# Patient Record
Sex: FEMALE | Race: WHITE | NOT HISPANIC OR LATINO | Employment: OTHER | URBAN - NONMETROPOLITAN AREA
[De-identification: names, ages, dates, MRNs, and addresses within clinical notes are randomized per-mention and may not be internally consistent; named-entity substitution may affect disease eponyms.]

---

## 2024-06-18 ENCOUNTER — OFFICE VISIT (OUTPATIENT)
Dept: CARDIOLOGY | Facility: CLINIC | Age: 69
End: 2024-06-18
Payer: MEDICARE

## 2024-06-18 VITALS
WEIGHT: 157.6 LBS | DIASTOLIC BLOOD PRESSURE: 83 MMHG | HEIGHT: 64 IN | SYSTOLIC BLOOD PRESSURE: 160 MMHG | OXYGEN SATURATION: 95 % | BODY MASS INDEX: 26.91 KG/M2 | HEART RATE: 80 BPM

## 2024-06-18 DIAGNOSIS — R09.89 ABSENT PEDAL PULSES: ICD-10-CM

## 2024-06-18 DIAGNOSIS — I66.02 MIDDLE CEREBRAL ARTERY STENOSIS, LEFT: ICD-10-CM

## 2024-06-18 DIAGNOSIS — I73.9 CLAUDICATION OF BOTH LOWER EXTREMITIES: ICD-10-CM

## 2024-06-18 DIAGNOSIS — I73.9 PAD (PERIPHERAL ARTERY DISEASE): ICD-10-CM

## 2024-06-18 DIAGNOSIS — I63.9 CEREBROVASCULAR ACCIDENT (CVA), UNSPECIFIED MECHANISM: ICD-10-CM

## 2024-06-18 DIAGNOSIS — I66.01 MIDDLE CEREBRAL ARTERY STENOSIS, RIGHT: ICD-10-CM

## 2024-06-18 DIAGNOSIS — E78.5 HYPERLIPIDEMIA LDL GOAL <70: ICD-10-CM

## 2024-06-18 DIAGNOSIS — I60.9 SUBARACHNOID HEMORRHAGE: ICD-10-CM

## 2024-06-18 DIAGNOSIS — R42 DIZZINESS: ICD-10-CM

## 2024-06-18 DIAGNOSIS — I77.1 SUBCLAVIAN ARTERY STENOSIS, LEFT: ICD-10-CM

## 2024-06-18 DIAGNOSIS — I10 ESSENTIAL HYPERTENSION: ICD-10-CM

## 2024-06-18 DIAGNOSIS — I65.23 BILATERAL CAROTID ARTERY STENOSIS: Primary | ICD-10-CM

## 2024-06-18 PROBLEM — F17.200 SMOKER: Status: ACTIVE | Noted: 2024-06-18

## 2024-06-18 PROCEDURE — 3077F SYST BP >= 140 MM HG: CPT | Performed by: INTERNAL MEDICINE

## 2024-06-18 PROCEDURE — 3079F DIAST BP 80-89 MM HG: CPT | Performed by: INTERNAL MEDICINE

## 2024-06-18 PROCEDURE — 99204 OFFICE O/P NEW MOD 45 MIN: CPT | Performed by: INTERNAL MEDICINE

## 2024-06-18 RX ORDER — LEVOTHYROXINE SODIUM 0.05 MG/1
50 TABLET ORAL DAILY
COMMUNITY
Start: 2024-04-21

## 2024-06-18 RX ORDER — AMLODIPINE BESYLATE 2.5 MG/1
2.5 TABLET ORAL DAILY
Qty: 30 TABLET | Refills: 11 | Status: SHIPPED | OUTPATIENT
Start: 2024-06-18

## 2024-06-18 RX ORDER — ASPIRIN 81 MG/1
81 TABLET, CHEWABLE ORAL DAILY
COMMUNITY

## 2024-06-18 RX ORDER — LISINOPRIL 5 MG/1
5 TABLET ORAL DAILY
COMMUNITY
Start: 2024-04-19

## 2024-06-18 RX ORDER — ATORVASTATIN CALCIUM 40 MG/1
40 TABLET, FILM COATED ORAL DAILY
COMMUNITY
Start: 2024-04-19

## 2024-06-18 NOTE — PROGRESS NOTES
Subjective   Josephine Quevedo is a 68 y.o. female     Chief Complaint   Patient presents with    Establish Care     Here for eval. Recent hosp. Admit with HX multiple cerebral infarcts per MRI    Cerebrovascular Accident    Shortness of Breath    Hyperlipidemia    Hypertension    Carotid Artery Disease       PROBLEM LIST:     Has appt. With Dr. Gabrielle Carlisle, neurology at  on 7-2-2024    ROPE score = 2    MRI brain at [facility], 4- with multiple acute punctuate infarcts along MCA  with largest in posterior left parietal lobe and acute subarachnoid hemorrhage  CTA angio head/neck at [facility], 4-. 60% bilat. Carotid calcification, MCA nearly obstructed with only minimal branch vessel with perfusion, concerns for sign. Stenosis of left vertebral, severe narrowing prox. 5 mm rt. MI segment with short segment near critical stenosis approx. 5 mm from MI origin, vocal cord nodule 6 mm, 50% left subclavian artery stenosis  HTN  4.   Hyperlipidemia  5. Hypothyroidism  6. Smoker  7. Echo, 4- during admit. EF 60-65%. Mod. LVH, + bubble study for PFO, mild AV sclerosis, trace MR, mild TR, trace-mild physiologic FL    Specialty Problems    None        HPI:  Ms. Josephine Quevedo is a 68-year-old female patient of Dee Shabazz seen today to establish care and for evaluation of the above.    The patient was in her usual state of health until last month.  She awoke from sleep with symptoms compatible with left middle cerebral artery ischemia.  She was found to have total occlusion of the right middle cerebral artery, high-grade stenosis in the left middle cerebral artery, vertebral stenosis, moderate carotid disease bilaterally as well as subclavian stenosis.  Although the patient's symptoms lasted only 15 to 20 minutes by her report CT and MRI demonstrated multiple small punctate infarcts in the left MCA distribution.    An echocardiogram was performed and the patient had right to left shunting through a PFO.  The  patient was discharged on statin, aspirin for antiplatelet therapy, and lisinopril to address hypertension.  She was started on thyroid replacement because of elevated TSH.  Ms. Quevedo had no symptoms of acute cerebral ischemia since discharge from [facility].                      PRIOR MEDICATIONS    Current Outpatient Medications on File Prior to Visit   Medication Sig Dispense Refill    aspirin 81 MG chewable tablet Chew 1 tablet Daily.      atorvastatin (LIPITOR) 40 MG tablet Take 1 tablet by mouth Daily.      levothyroxine (SYNTHROID, LEVOTHROID) 50 MCG tablet Take 1 tablet by mouth Daily.      lisinopril (PRINIVIL,ZESTRIL) 5 MG tablet Take 1 tablet by mouth Daily.       No current facility-administered medications on file prior to visit.       ALLERGIES:    Naproxen    PAST MEDICAL HISTORY:    Past Medical History:   Diagnosis Date    Carotid artery stenosis     Hyperlipidemia     Hypertension     PAD (peripheral artery disease)     Stroke        SURGICAL HISTORY:    Past Surgical History:   Procedure Laterality Date    TUBAL ABDOMINAL LIGATION         SOCIAL HISTORY:    Social History     Socioeconomic History    Marital status:    Tobacco Use    Smoking status: Every Day     Types: Cigarettes    Smokeless tobacco: Never    Tobacco comments:     Smokes 1 PPD for approx. 50 yrs.    Substance and Sexual Activity    Alcohol use: Never    Drug use: Never       FAMILY HISTORY:    Family History   Problem Relation Age of Onset    Heart failure Mother     Stroke Father        Review of Systems   Constitutional:  Positive for fatigue (a little more easily since hosp. admit in April.). Negative for chills, diaphoresis, fever and unexpected weight change.   HENT: Negative.     Eyes: Negative.    Respiratory:  Positive for shortness of breath.    Cardiovascular: Negative.    Gastrointestinal:  Negative for blood in stool (denies melena,hemoptysis), constipation and diarrhea.   Endocrine: Negative for cold intolerance  "and heat intolerance.   Genitourinary: Negative.    Musculoskeletal:  Positive for arthralgias and myalgias.   Skin: Negative.    Allergic/Immunologic: Negative.    Neurological:  Positive for dizziness and light-headedness. Negative for syncope.   Hematological:  Bruises/bleeds easily.   Psychiatric/Behavioral: Negative.         VISIT VITALS:  Vitals:    06/18/24 1021   BP: 160/83   BP Location: Left arm   Patient Position: Sitting   Pulse: 80   SpO2: 95%   Weight: 71.5 kg (157 lb 9.6 oz)   Height: 162.6 cm (64\")      /83 (BP Location: Left arm, Patient Position: Sitting)   Pulse 80   Ht 162.6 cm (64\")   Wt 71.5 kg (157 lb 9.6 oz)   SpO2 95%   BMI 27.05 kg/m²     RECENT LABS:    Objective       Physical Exam  Vitals and nursing note reviewed.   Constitutional:       General: She is not in acute distress.     Appearance: She is well-developed.   HENT:      Head: Normocephalic and atraumatic.   Eyes:      Conjunctiva/sclera: Conjunctivae normal.      Pupils: Pupils are equal, round, and reactive to light.      Comments: No ptosis or lid lag  Extra ocular motions intact  JAYLEN     Neck:      Vascular: Carotid bruit (left carotid bruit) present. No hepatojugular reflux or JVD.      Trachea: No tracheal deviation.      Comments: Nl. Carotid upstrokes    Cardiovascular:      Rate and Rhythm: Normal rate and regular rhythm.      Pulses:           Radial pulses are 2+ on the right side and 2+ on the left side.      Heart sounds: Normal heart sounds, S1 normal and S2 normal. No murmur heard.     No friction rub. S4 sounds present. No S3 sounds.      Comments: Normal splitting of S2  Pulmonary:      Effort: Pulmonary effort is normal.      Breath sounds: Normal breath sounds. No wheezing, rhonchi or rales.      Comments: Nl. Expir. Phase  Nl. Breath sound intensity    Abdominal:      General: Bowel sounds are normal. There is no distension or abdominal bruit.      Palpations: Abdomen is soft. There is no mass.     "  Tenderness: There is no abdominal tenderness. There is no guarding or rebound.      Comments: No organomegaly   Musculoskeletal:         General: No tenderness or deformity. Normal range of motion.      Cervical back: Normal range of motion and neck supple.      Right lower leg: Edema present.      Left lower leg: No edema.      Comments: LLE, no edema, unable to palpitate pedal pulses, cap. Refill 4 sec.   RLE, trace edema, unable to palpitate pedal pulses, cap. Refill 5 sec.    Skin:     General: Skin is warm and dry.      Coloration: Skin is not pale.      Findings: No erythema or rash.   Neurological:      Mental Status: She is alert and oriented to person, place, and time.   Psychiatric:         Behavior: Behavior normal.         Thought Content: Thought content normal.         Judgment: Judgment normal.         Procedures      Assessment & Plan   .  Stroke.  The patient had multiple left middle cerebral artery infarcts.  She also had right to left shunting at the atrial level and echocardiogram compatible with PFO.  Rope score is 2 or 3 giving her an attributable risk of stroke being due to PFO of 0.  This corroborates the clinical impression that PFO is an unlikely cause given the patient's hypertension, dyslipidemia, smoking history, and documented peripheral arterial disease.  Therefore, I do not think the PFO needs to be evaluated further.  Ms. Quevedo would have a [id number] 2 score of 7 and is therefore considered high risk for stroke where she did have atrial fibrillation.  We will perform 30-day event monitor to further evaluate.    2.  Systemic hypertension.  We will add amlodipine 2.5 mg daily to the patient's current regimen and follow blood pressures closely.  The patient understands to report any acute neurologic symptoms immediately.    3.  Peripheral arterial disease.  The patient is scheduled to be evaluated at [facility].  I encouraged her to follow-up with that  consultation.    4.  Severe dyslipidemia.  We will continue current statin dosing, check labs, and adjust medication accordingly.    5.  Tobacco graduation.  We discussed risk factor modification.  Ms. Quevedo will make an attempt at decreasing her cigarette exposure.    6.  Peripheral arterial disease.  CTA findings as above.  Additionally the patient describes bilateral claudication and has abnormal lower extremity exam.  We will perform ABIs and segmental duplex study to further evaluate.    #7.  Ms. Quevedo will follow with Dee Shabazz as instructed we will plan on seeing her in follow-up after testing or on appearing basis as discussed.   Diagnosis Plan   1. Bilateral carotid artery stenosis        2. Essential hypertension        3. Hyperlipidemia LDL goal <70        4. PAD (peripheral artery disease)        5. Subclavian artery stenosis, left        6. Middle cerebral artery stenosis, left        7. Middle cerebral artery stenosis, right        8. Subarachnoid hemorrhage            No follow-ups on file.         Josephine Quevedo  reports that she has been smoking cigarettes. She has never used smokeless tobacco. I have educated her on the risk of diseases from using tobacco products such as cancer, COPD, and heart disease.     I advised her to quit and she is not willing to quit.    I spent 3  minutes counseling the patient.          BMI is >= 25 and <30. (Overweight) The following options were offered after discussion;: pcp addressing       Advance Care Planning   ACP discussion was held with the patient during this visit. Patient does not have an advance directive, declines further assistance.            Electronically signed by:    Scribed for Brice Castillo MD by Nedra Dave LPN on June 18, 2024  at 10:55 EDT    I, Brice Castillo MD personally performed the services described in this documentation as scribed by the above named individual in my presence, and it is both accurate and complete. June 18,  2024 10:55 EDT      Dictated Utilizing Dragon Dictation: Part of this note may be an electronic transcription/translation of spoken language to printed text using the Dragon Dictation System.

## 2024-07-09 ENCOUNTER — HOSPITAL ENCOUNTER (OUTPATIENT)
Dept: CARDIOLOGY | Facility: HOSPITAL | Age: 69
Discharge: HOME | End: 2024-07-09
Admitting: INTERNAL MEDICINE
Payer: MEDICARE

## 2024-07-09 DIAGNOSIS — R09.89 ABSENT PEDAL PULSES: ICD-10-CM

## 2024-07-09 DIAGNOSIS — I73.9 PAD (PERIPHERAL ARTERY DISEASE): ICD-10-CM

## 2024-07-09 DIAGNOSIS — I73.9 CLAUDICATION OF BOTH LOWER EXTREMITIES: ICD-10-CM

## 2024-07-09 LAB
BH CV LEA LEFT ANT TIBIAL A DISTAL PSV: 43 CM/S
BH CV LEA LEFT CFA PROX PSV: 53 CM/S
BH CV LEA LEFT DFA PROX PSV: 25 CM/S
BH CV LEA LEFT POPITEAL A  PROX PSV: 41 CM/S
BH CV LEA LEFT PTA DISTAL PSV: 22 CM/S
BH CV LEA LEFT SFA DISTAL PSV: 32 CM/S
BH CV LEA LEFT SFA MID PSV: 46 CM/S
BH CV LEA LEFT SFA PROX PSV: 41 CM/S
BH CV LEA RIGHT ANT TIBIAL A DISTAL PSV: 37 CM/S
BH CV LEA RIGHT CFA PROX PSV: 48 CM/S
BH CV LEA RIGHT DFA PROX PSV: 28 CM/S
BH CV LEA RIGHT POPITEAL A  PROX PSV: 27 CM/S
BH CV LEA RIGHT PTA DISTAL PSV: 20 CM/S
BH CV LEA RIGHT SFA DISTAL PSV: -32.4 CM/S
BH CV LEA RIGHT SFA MID PSV: -45.5 CM/S
BH CV LEA RIGHT SFA PROX PSV: -42.1 CM/S
LEFT GROIN CFA SYS: 52.9 CM/SEC
RIGHT GROIN CFA SYS: 48.3 CM/SEC

## 2024-07-09 PROCEDURE — 93925 LOWER EXTREMITY STUDY: CPT

## 2024-07-21 LAB
BH CV LEA LEFT ANT TIBIAL A DISTAL PSV: 43 CM/S
BH CV LEA LEFT CFA PROX PSV: 53 CM/S
BH CV LEA LEFT DFA PROX PSV: 25 CM/S
BH CV LEA LEFT POPITEAL A  PROX PSV: 41 CM/S
BH CV LEA LEFT PTA DISTAL PSV: 22 CM/S
BH CV LEA LEFT SFA DISTAL PSV: -32.4 CM/S
BH CV LEA LEFT SFA MID PSV: -45.6 CM/S
BH CV LEA LEFT SFA PROX PSV: -40.8 CM/S
BH CV LEA RIGHT ANT TIBIAL A DISTAL PSV: 37 CM/S
BH CV LEA RIGHT CFA PROX PSV: 48 CM/S
BH CV LEA RIGHT DFA PROX PSV: 28 CM/S
BH CV LEA RIGHT POPITEAL A  PROX PSV: 27 CM/S
BH CV LEA RIGHT PTA DISTAL PSV: 20 CM/S
BH CV LEA RIGHT SFA DISTAL PSV: -32.4 CM/S
BH CV LEA RIGHT SFA MID PSV: -45.5 CM/S
BH CV LEA RIGHT SFA PROX PSV: -42.1 CM/S
LEFT GROIN CFA SYS: 52.9 CM/SEC
RIGHT GROIN CFA SYS: 48.3 CM/SEC

## 2024-07-22 ENCOUNTER — TELEPHONE (OUTPATIENT)
Dept: CARDIOLOGY | Facility: CLINIC | Age: 69
End: 2024-07-22
Payer: MEDICARE

## 2024-07-22 DIAGNOSIS — I73.9 PAD (PERIPHERAL ARTERY DISEASE): Primary | ICD-10-CM

## 2024-07-22 DIAGNOSIS — I70.213 ATHEROSCLEROSIS OF NATIVE ARTERIES OF EXTREMITIES WITH INTERMITTENT CLAUDICATION, BILATERAL LEGS: ICD-10-CM

## 2024-07-22 DIAGNOSIS — I73.9 CLAUDICATION OF BOTH LOWER EXTREMITIES: ICD-10-CM

## 2024-07-22 DIAGNOSIS — R09.89 ABSENT PEDAL PULSES: ICD-10-CM

## 2024-07-22 NOTE — TELEPHONE ENCOUNTER
BLE U/S RESULTS REVIEWED WITH MRS. HENSON AND SHE WAS AGREEABLE TO BLE CTA BEING SCHEDULED. SHE DENIES ANY CONTRAST OR SHELLFISH ALLERGY. WILL NEED A BMP PRIOR PER [facility] PROTOCOL AND OK PER DR. CASTILLO. PATIENT AWARE WILL BE CALLED WITH CTA APPT. VIDYA PALENCIA          ----- Message from Brice Castillo sent at 7/22/2024  1:43 PM EDT -----  Arrange for bilateral lower extremity CTA based on the abnormal duplex findings follow-up after that procedure  ----- Message -----  From: Brice Castillo MD  Sent: 7/21/2024  10:42 AM EDT  To: Brice Castillo MD

## 2024-08-20 ENCOUNTER — OFFICE VISIT (OUTPATIENT)
Dept: CARDIOLOGY | Facility: CLINIC | Age: 69
End: 2024-08-20
Payer: MEDICARE

## 2024-08-20 VITALS
OXYGEN SATURATION: 97 % | DIASTOLIC BLOOD PRESSURE: 77 MMHG | BODY MASS INDEX: 27.08 KG/M2 | WEIGHT: 158.6 LBS | HEART RATE: 89 BPM | HEIGHT: 64 IN | SYSTOLIC BLOOD PRESSURE: 120 MMHG

## 2024-08-20 DIAGNOSIS — I65.23 BILATERAL CAROTID ARTERY STENOSIS: Primary | ICD-10-CM

## 2024-08-20 DIAGNOSIS — F17.200 SMOKER: ICD-10-CM

## 2024-08-20 DIAGNOSIS — I73.9 PAD (PERIPHERAL ARTERY DISEASE): ICD-10-CM

## 2024-08-20 DIAGNOSIS — I77.1 SUBCLAVIAN ARTERY STENOSIS, LEFT: ICD-10-CM

## 2024-08-20 DIAGNOSIS — I10 ESSENTIAL HYPERTENSION: ICD-10-CM

## 2024-08-20 DIAGNOSIS — E78.5 HYPERLIPIDEMIA LDL GOAL <70: ICD-10-CM

## 2024-08-20 PROCEDURE — 3074F SYST BP LT 130 MM HG: CPT | Performed by: INTERNAL MEDICINE

## 2024-08-20 PROCEDURE — 99213 OFFICE O/P EST LOW 20 MIN: CPT | Performed by: INTERNAL MEDICINE

## 2024-08-20 PROCEDURE — 3078F DIAST BP <80 MM HG: CPT | Performed by: INTERNAL MEDICINE

## 2024-08-20 RX ORDER — ACETAMINOPHEN 500 MG
500 TABLET ORAL EVERY 6 HOURS PRN
COMMUNITY

## 2024-08-20 RX ORDER — ALBUTEROL SULFATE 90 UG/1
1 AEROSOL, METERED RESPIRATORY (INHALATION) SEE ADMIN INSTRUCTIONS
COMMUNITY
Start: 2024-08-12

## 2024-08-20 NOTE — PROGRESS NOTES
Subjective   Josephine Quevedo is a 68 y.o. female     Chief Complaint   Patient presents with    Follow-up     Here for testing f/u    Hyperlipidemia    Hypertension    Carotid Artery Disease       PROBLEM LIST:     Has appt. With Dr. Gabrielle Carlisle, neurology at  on 7-2-2024     ROPE score = 2     MRI brain at [facility], 4- with multiple acute punctuate infarcts along MCA  with largest in posterior left parietal lobe and acute subarachnoid hemorrhage  CTA angio head/neck at [facility], 4-. 60% bilat. Carotid calcification, MCA nearly obstructed with only minimal branch vessel with perfusion, concerns for sign. Stenosis of left vertebral, severe narrowing prox. 5 mm rt. MI segment with short segment near critical stenosis approx. 5 mm from MI origin, vocal cord nodule 6 mm, 50% left subclavian artery stenosis  HTN  4.   Hyperlipidemia  5. Hypothyroidism  6. Smoker  7. Echo, 4- during admit. EF 60-65%. Mod. LVH, + bubble study for PFO, mild AV sclerosis, trace MR, mild TR, trace-mild physiologic [place]  8. PAD   8.1 BLE art. U/S, 7-9-2024 with diffuse atherosclerotic changes in BLE predom. The CFA  9. Event monitor, 6- -7-. SR, no arrhythmias    Specialty Problems          Cardiology Problems    Bilateral carotid artery stenosis        Essential hypertension        Hyperlipidemia LDL goal <70        Middle cerebral artery stenosis, left        Middle cerebral artery stenosis, right        PAD (peripheral artery disease)        Subclavian artery stenosis, left             HPI:    Ms. Quevedo returns for follow-up on the above.    She has had no further neurologic symptoms to suggest recurrent emboli.    Event monitor demonstrated no atrial fibrillation and, in fact, no dysrhythmic substrate whatsoever.    Lower extremity duplex study demonstrated diffuse disease bilaterally with a low flow velocities throughout.  Therefore, CTA was ordered.  That study was performed yesterday but we have no results  currently.    Oh we talked about risk factor modification to prevent recurrent cerebral ischemic events.  The patient has no interest in stopping smoking at this time.  We informed her that we would help in any way possible if she decides to pursue efforts at smoking cessation.  We have no repeat lipids after starting high-dose statin.                  PRIOR MEDICATIONS    Current Outpatient Medications on File Prior to Visit   Medication Sig Dispense Refill    acetaminophen (TYLENOL) 500 MG tablet Take 1 tablet by mouth Every 6 (Six) Hours As Needed for Mild Pain.      albuterol sulfate  (90 Base) MCG/ACT inhaler Inhale 1 puff See Admin Instructions. Inhale 2 puffs by mouth every 4 to 6 hours as needed      amLODIPine (NORVASC) 2.5 MG tablet Take 1 tablet by mouth Daily. 30 tablet 11    aspirin 81 MG chewable tablet Chew 1 tablet Daily.      atorvastatin (LIPITOR) 40 MG tablet Take 1 tablet by mouth Daily.      levothyroxine (SYNTHROID, LEVOTHROID) 50 MCG tablet Take 1 tablet by mouth Daily.      lisinopril (PRINIVIL,ZESTRIL) 5 MG tablet Take 1 tablet by mouth Daily.       No current facility-administered medications on file prior to visit.       ALLERGIES:    Naproxen    PAST MEDICAL HISTORY:    Past Medical History:   Diagnosis Date    Carotid artery stenosis     Hyperlipidemia     Hypertension     PAD (peripheral artery disease)     Stroke        SURGICAL HISTORY:    Past Surgical History:   Procedure Laterality Date    TUBAL ABDOMINAL LIGATION         SOCIAL HISTORY:    Social History     Socioeconomic History    Marital status:    Tobacco Use    Smoking status: Every Day     Types: Cigarettes    Smokeless tobacco: Never    Tobacco comments:     Smokes 1 PPD for approx. 50 yrs.    Substance and Sexual Activity    Alcohol use: Never    Drug use: Never       FAMILY HISTORY:    Family History   Problem Relation Age of Onset    Heart failure Mother     Stroke Father        Review of Systems  "  Constitutional: Negative.    HENT: Negative.     Eyes:  Positive for visual disturbance (reading glasses).   Respiratory: Negative.     Cardiovascular:  Positive for leg swelling (with sitting long periods). Negative for chest pain and palpitations.   Gastrointestinal: Negative.    Endocrine: Negative.    Genitourinary: Negative.    Musculoskeletal:  Positive for arthralgias and myalgias.   Skin: Negative.    Allergic/Immunologic: Negative.    Neurological:  Positive for dizziness and light-headedness. Negative for syncope.   Hematological:  Bruises/bleeds easily.   Psychiatric/Behavioral: Negative.         VISIT VITALS:  Vitals:    08/20/24 1030   BP: 120/77   BP Location: Left arm   Patient Position: Sitting   Pulse: 89   SpO2: 97%   Weight: 71.9 kg (158 lb 9.6 oz)   Height: 162.6 cm (64.02\")      /77 (BP Location: Left arm, Patient Position: Sitting)   Pulse 89   Ht 162.6 cm (64.02\")   Wt 71.9 kg (158 lb 9.6 oz)   SpO2 97%   BMI 27.21 kg/m²     RECENT LABS:    Objective       Physical Exam    Procedures      Assessment & Plan   #1.  Severe peripheral arterial disease as described previously.  The patient has had no symptoms of recurrent cerebral ischemia with lower extremity duplex findings as above.  We are awaiting results of CTA.  We will continue to pursue maximal risk factor modification.  Event monitor demonstrated no atrial fibrillation.  Therefore no treatment changes indicated.    2.  Controlled systemic hypertension.    3.  Severe dyslipidemia.  We will recheck fasting lipid profile liver enzymes.    4.  Smoker habituation.  See discussion above.    5.  Ms. Quevedo will follow with Dee Shabazz as instructed we will plan on seeing her in follow-up in 6 months or on appearing basis as discussed.   Diagnosis Plan   1. Bilateral carotid artery stenosis        2. Essential hypertension        3. Hyperlipidemia LDL goal <70        4. PAD (peripheral artery disease)        5. Subclavian artery " stenosis, left        6. Smoker            No follow-ups on file.         Josephine Quevedo  reports that she has been smoking cigarettes. She has never used smokeless tobacco. I have educated her on the risk of diseases from using tobacco products such as cancer, COPD, and heart disease.     I advised her to quit and she is not willing to quit.    I spent 3  minutes counseling the patient.                  Advance Care Planning   ACP discussion was held with the patient during this visit. Patient does not have an advance directive, declines further assistance.            Electronically signed by:    Scribed for Brice Castillo MD by Nedra Dave LPN on August 20, 2024  at 10:40 EDT    I, Brice Castillo MD personally performed the services described in this documentation as scribed by the above named individual in my presence, and it is both accurate and complete. August 20, 2024 10:40 EDT      Dictated Utilizing Dragon Dictation: Part of this note may be an electronic transcription/translation of spoken language to printed text using the Dragon Dictation System.

## 2024-08-23 ENCOUNTER — TELEPHONE (OUTPATIENT)
Dept: CARDIOLOGY | Facility: CLINIC | Age: 69
End: 2024-08-23
Payer: MEDICARE

## 2024-08-23 DIAGNOSIS — I70.0 STENOSIS OF INFRARENAL ABDOMINAL AORTA DUE TO ATHEROSCLEROSIS: Primary | ICD-10-CM

## 2024-08-23 DIAGNOSIS — R93.89 ABNORMAL COMPUTED TOMOGRAPHY ANGIOGRAPHY (CTA): ICD-10-CM

## 2024-08-23 NOTE — TELEPHONE ENCOUNTER
CTA RESULTS TO BE REVIEWED BY DR. MCNEAL NEXT WEEK AND PATIENT AWARE WILL CALL HER WITH RESULTS. PH,LPN    CTA RESULTS REVIEWED BY DR. MCNEAL AND ORDER RECEIVED TO REFER TO DR. KENNEDY. DISCUSSED WITH MRS. HENSON AND SHE WAS AGREEABLE TO REFERRAL. VIDYA PALENCIA

## 2024-10-01 ENCOUNTER — LAB (OUTPATIENT)
Dept: LAB | Facility: HOSPITAL | Age: 69
End: 2024-10-01
Payer: MEDICARE

## 2024-10-01 PROCEDURE — 80076 HEPATIC FUNCTION PANEL: CPT | Performed by: INTERNAL MEDICINE

## 2024-10-01 PROCEDURE — 80061 LIPID PANEL: CPT | Performed by: INTERNAL MEDICINE

## 2024-10-03 ENCOUNTER — TELEPHONE (OUTPATIENT)
Dept: CARDIOLOGY | Facility: CLINIC | Age: 69
End: 2024-10-03
Payer: MEDICARE

## 2024-10-03 DIAGNOSIS — E78.5 HYPERLIPIDEMIA LDL GOAL <70: ICD-10-CM

## 2024-10-03 DIAGNOSIS — I77.1 SUBCLAVIAN ARTERY STENOSIS, LEFT: ICD-10-CM

## 2024-10-03 DIAGNOSIS — I73.9 PAD (PERIPHERAL ARTERY DISEASE): ICD-10-CM

## 2024-10-03 DIAGNOSIS — I70.0 STENOSIS OF INFRARENAL ABDOMINAL AORTA DUE TO ATHEROSCLEROSIS: Primary | ICD-10-CM

## 2024-10-03 RX ORDER — EZETIMIBE 10 MG/1
10 TABLET ORAL DAILY
Qty: 30 TABLET | Refills: 11 | Status: SHIPPED | OUTPATIENT
Start: 2024-10-03

## 2024-10-03 RX ORDER — ATORVASTATIN CALCIUM 80 MG/1
80 TABLET, FILM COATED ORAL DAILY
Qty: 30 TABLET | Refills: 11 | Status: SHIPPED | OUTPATIENT
Start: 2024-10-03

## 2024-10-03 NOTE — TELEPHONE ENCOUNTER
CHOL. RESULTS DISCUSSED AND AGREEABLE TO INCREASING LIPITOR TO 80 MG DAILY AND START ZETIA 10 MG DAILY AND WILL COME TO OUR LAB DOWNSTA[facility] IN 8 WEEKS FOR REPEAT FASTING LABS. UPDATED SCRIPT SENT TO PHARM. VERBALIZED SHE UNDERSTOOD. VIDYA PALENCIA          ----- Message from Brice Castillo sent at 10/3/2024  1:53 PM EDT -----  Increase Lipitor to 80 mg daily and add Zetia 10 mg daily.  Recheck lipids in 6 to 8 weeks.  ----- Message -----  From: Lab, Background User  Sent: 10/1/2024   4:00 PM EDT  To: Brice Castillo MD

## 2024-11-25 ENCOUNTER — LAB (OUTPATIENT)
Dept: LAB | Facility: HOSPITAL | Age: 69
End: 2024-11-25
Payer: MEDICARE

## 2024-11-25 PROCEDURE — 80076 HEPATIC FUNCTION PANEL: CPT | Performed by: INTERNAL MEDICINE

## 2024-11-25 PROCEDURE — 80061 LIPID PANEL: CPT | Performed by: INTERNAL MEDICINE

## 2025-02-24 ENCOUNTER — OFFICE VISIT (OUTPATIENT)
Dept: CARDIOLOGY | Facility: CLINIC | Age: 70
End: 2025-02-24
Payer: MEDICARE

## 2025-02-24 VITALS
BODY MASS INDEX: 27.79 KG/M2 | DIASTOLIC BLOOD PRESSURE: 85 MMHG | SYSTOLIC BLOOD PRESSURE: 145 MMHG | OXYGEN SATURATION: 97 % | WEIGHT: 162.8 LBS | HEART RATE: 89 BPM | HEIGHT: 64 IN

## 2025-02-24 DIAGNOSIS — I66.02 MIDDLE CEREBRAL ARTERY STENOSIS, LEFT: ICD-10-CM

## 2025-02-24 DIAGNOSIS — I10 ESSENTIAL HYPERTENSION: ICD-10-CM

## 2025-02-24 DIAGNOSIS — I65.23 BILATERAL CAROTID ARTERY STENOSIS: ICD-10-CM

## 2025-02-24 DIAGNOSIS — I77.1 SUBCLAVIAN ARTERY STENOSIS, LEFT: ICD-10-CM

## 2025-02-24 DIAGNOSIS — I63.9 CEREBROVASCULAR ACCIDENT (CVA), UNSPECIFIED MECHANISM: ICD-10-CM

## 2025-02-24 DIAGNOSIS — I73.9 PAD (PERIPHERAL ARTERY DISEASE): Primary | ICD-10-CM

## 2025-02-24 DIAGNOSIS — E78.5 HYPERLIPIDEMIA LDL GOAL <70: ICD-10-CM

## 2025-02-24 DIAGNOSIS — I66.01 MIDDLE CEREBRAL ARTERY STENOSIS, RIGHT: ICD-10-CM

## 2025-02-24 DIAGNOSIS — I70.0 STENOSIS OF INFRARENAL ABDOMINAL AORTA DUE TO ATHEROSCLEROSIS: ICD-10-CM

## 2025-02-24 PROCEDURE — 3077F SYST BP >= 140 MM HG: CPT | Performed by: CLINICAL NURSE SPECIALIST

## 2025-02-24 PROCEDURE — 99214 OFFICE O/P EST MOD 30 MIN: CPT | Performed by: CLINICAL NURSE SPECIALIST

## 2025-02-24 PROCEDURE — 3079F DIAST BP 80-89 MM HG: CPT | Performed by: CLINICAL NURSE SPECIALIST

## 2025-02-24 RX ORDER — AMLODIPINE BESYLATE 2.5 MG/1
2.5 TABLET ORAL DAILY
Qty: 90 TABLET | Refills: 3 | Status: SHIPPED | OUTPATIENT
Start: 2025-02-24

## 2025-02-24 NOTE — PROGRESS NOTES
Subjective     Josephine Quevedo is a 69 y.o. female who presents today for Follow-up (6mth).    CHIEF COMPLIANT  Chief Complaint   Patient presents with    Follow-up     6mth       Active Problems:  MRI brain at [facility], 4- with multiple acute punctuate infarcts along MCA  with largest in posterior left parietal lobe and acute subarachnoid hemorrhage  CTA angio head/neck at [facility], 4-. 60% bilat. Carotid calcification, MCA nearly obstructed with only minimal branch vessel with perfusion, concerns for sign. Stenosis of left vertebral, severe narrowing prox. 5 mm rt. MI segment with short segment near critical stenosis approx. 5 mm from MI origin, vocal cord nodule 6 mm, 50% left subclavian artery stenosis  HTN  4.   Hyperlipidemia  5. Hypothyroidism  6. Smoker  7. Echo, 4- during admit. EF 60-65%. Mod. LVH, + bubble study for PFO, mild AV sclerosis, trace MR, mild TR, trace-mild physiologic [place]  8. PAD   8.1 BLE art. U/S, 7-9-2024 with diffuse atherosclerotic changes in BLE predom. The CFA  8.2 CTA of the abdomen with runoff in July 2024 which showed infrarenal abdominal aorta with collateral vessels which resupply flow into the distal common iliac arteries bilaterally.  Referred to Dr. Hartman  9. Event monitor, 6- -7-. SR, no arrhythmias      HPI  The patient is a 69-year-old female that returns for follow-up.  Overall the patient states she has been doing well since her last visit.  She did have a CTA of the abdomen with runoff in July 2024 which showed infrarenal abdominal aorta with collateral vessels which resupply flow into the distal common iliac arteries bilaterally.  The patient was referred to Dr. Hartman by Dr. Castillo at that time.  The patient went for the appointment and states that due to emergency surgery she was unable to see Dr. Hartman that day.  She states that her appointment was at 830 that morning and she left after 1:00 that afternoon and still had not been seen.   The patient does not wish to reschedule her appointment at that office.  She does have some intermittent cramping in her lower extremities and what she describes as a burning in her left thigh.  The patient also has a history of left MCA stroke, left MCA stenosis and right MCA occlusion. She was referred to neurosurgery at  and a diagnostic cerebral angiogram was recommended. Unfortunately the patient elected not to proceed with this as well.  She states she has had no further stroke symptoms and since she was doing fine did not think she needed to do it.     She denies chest pain.  She does have baseline dyspnea due to chronic lung disease and smoking but states this is stable.   She denies syncope, near syncope.  She does have occasional dizziness with quick position change but states this has occurred for a long time.  BP borderline in the office.  HR stable.        PRIOR MEDS  Current Outpatient Medications on File Prior to Visit   Medication Sig Dispense Refill    acetaminophen (TYLENOL) 500 MG tablet Take 1 tablet by mouth Every 6 (Six) Hours As Needed for Mild Pain.      albuterol sulfate  (90 Base) MCG/ACT inhaler Inhale 1 puff See Admin Instructions. Inhale 2 puffs by mouth every 4 to 6 hours as needed      aspirin 81 MG chewable tablet Chew 1 tablet Daily.      atorvastatin (LIPITOR) 80 MG tablet Take 1 tablet by mouth Daily. 30 tablet 11    ezetimibe (ZETIA) 10 MG tablet Take 1 tablet by mouth Daily. 30 tablet 11    levothyroxine (SYNTHROID, LEVOTHROID) 50 MCG tablet Take 1 tablet by mouth Daily.      lisinopril (PRINIVIL,ZESTRIL) 5 MG tablet Take 1 tablet by mouth Daily.      [DISCONTINUED] amLODIPine (NORVASC) 2.5 MG tablet Take 1 tablet by mouth Daily. 30 tablet 11     No current facility-administered medications on file prior to visit.       ALLERGIES  Naproxen    HISTORY  Past Medical History:   Diagnosis Date    Carotid artery stenosis     Hyperlipidemia     Hypertension     PAD  "(peripheral artery disease)     Stroke        Social History     Socioeconomic History    Marital status:    Tobacco Use    Smoking status: Every Day     Types: Cigarettes    Smokeless tobacco: Never    Tobacco comments:     Smokes 1 PPD for approx. 50 yrs.    Substance and Sexual Activity    Alcohol use: Never    Drug use: Never       Family History   Problem Relation Age of Onset    Heart failure Mother     Stroke Father        Review of Systems   Constitutional:  Positive for fatigue.   Respiratory:  Positive for shortness of breath. Negative for chest tightness.    Cardiovascular:  Negative for chest pain, palpitations and leg swelling.   Neurological:  Positive for dizziness (A few - states it's normal for her). Negative for syncope, weakness, numbness and headaches.   Hematological:  Bruises/bleeds easily.   Psychiatric/Behavioral:  Negative for sleep disturbance.        Objective     VITALS: /85   Pulse 89   Ht 162.6 cm (64\")   Wt 73.8 kg (162 lb 12.8 oz)   SpO2 97%   BMI 27.94 kg/m²     LABS:   Lab Results (most recent)       None            IMAGING:   No Images in the past 120 days found..    EXAM:  Physical Exam  Constitutional:       Appearance: Normal appearance.   Eyes:      Pupils: Pupils are equal, round, and reactive to light.   Cardiovascular:      Rate and Rhythm: Normal rate and regular rhythm.      Pulses:           Carotid pulses are 2+ on the right side and 2+ on the left side with bruit.       Radial pulses are 2+ on the right side and 1+ on the left side.      Heart sounds: Normal heart sounds.      Comments: Unable to palpate pedal pulses   Pulmonary:      Effort: Pulmonary effort is normal.      Breath sounds: Normal breath sounds.   Abdominal:      General: Bowel sounds are normal.      Palpations: Abdomen is soft.   Musculoskeletal:      Right lower leg: No edema.      Left lower leg: No edema.   Skin:     General: Skin is warm and dry.   Neurological:      General: No " focal deficit present.      Mental Status: She is alert and oriented to person, place, and time.   Psychiatric:         Mood and Affect: Mood normal.         Thought Content: Thought content normal.         Procedure   Procedures       Assessment & Plan    Diagnosis Plan   1. PAD (peripheral artery disease)  Ambulatory Referral to Vascular Surgery      2. Stenosis of infrarenal abdominal aorta due to atherosclerosis        3. Cerebrovascular accident (CVA), unspecified mechanism        4. Middle cerebral artery stenosis, left        5. Middle cerebral artery stenosis, right        6. Hyperlipidemia LDL goal <70        7. Bilateral carotid artery stenosis        8. Essential hypertension  amLODIPine (NORVASC) 2.5 MG tablet      9. Subclavian artery stenosis, left  Ambulatory Referral to Vascular Surgery        Plan:  PAD: CTA of the abdomen with runoff in July 2024 which showed infrarenal abdominal aorta with collateral vessels which resupply flow into the distal common iliac arteries bilaterally.  Unable to palpate pedal pulses, which is not a new assessment finding.  She does continue to have some claudication symptoms.  The patient would like to be referred locally to vascular.  Will make referral to Dr. Hurd.  Continue statin and aspirin.  CVA: The patient has a history of left MCA stroke, left MCA stenosis and right MCA occlusion. She was referred to neurosurgery at  and a diagnostic cerebral angiogram was recommended. Unfortunately the patient elected not to proceed with this.  I discussed with the patient that she needs to follow-up with neurosurgery at .  I asked her if we assist getting this appointment set back up.  At this time the patient states she is not ready to get the appointment set back up.  Continue statin and aspirin.  We did discuss the risks associated with not proceeding with follow-up.  Hyperlipidemia: Continue statin.  Will periodically review lipid panel.  Bilateral carotid artery  stenosis: The patient was encouraged to follow-up with neurosurgery.  At this time she is not ready to make this appointment.  I did instruct her that we can assist when she is ready.  Essential hypertension: BP is borderline.  Continue current medications for now.  Patient states her BP runs within a better range at home.    6.  L SC stenosis:  We are making referral to Dr Hurd for follow up on this.       Return in about 6 months (around 8/24/2025).    Josephine Quevedo  reports that she has been smoking cigarettes. She has never used smokeless tobacco. I have educated her on the risk of diseases from using tobacco products such as cancer, COPD, and heart disease.     I advised her to quit and she is not willing to quit.     Advance Care Planning  ACP discussion was held with the patient during this visit. Patient does not have an advance directive, declines further assistance.              MEDS ORDERED DURING VISIT:  New Medications Ordered This Visit   Medications    amLODIPine (NORVASC) 2.5 MG tablet     Sig: Take 1 tablet by mouth Daily.     Dispense:  90 tablet     Refill:  3       DISCONTINUED MEDS DURING VISIT:   Medications Discontinued During This Encounter   Medication Reason    amLODIPine (NORVASC) 2.5 MG tablet Reorder          This document has been electronically signed by RONEN Torrez  February 24, 2025 17:11 EST    Dictated Utilizing Dragon Dictation: Part of this note may be an electronic transcription/translation of spoken language to printed text using the Dragon Dictation System

## 2025-08-25 ENCOUNTER — OFFICE VISIT (OUTPATIENT)
Dept: CARDIOLOGY | Facility: CLINIC | Age: 70
End: 2025-08-25
Payer: MEDICARE

## 2025-08-25 VITALS
OXYGEN SATURATION: 98 % | DIASTOLIC BLOOD PRESSURE: 77 MMHG | SYSTOLIC BLOOD PRESSURE: 128 MMHG | BODY MASS INDEX: 27.31 KG/M2 | HEIGHT: 64 IN | WEIGHT: 160 LBS | HEART RATE: 73 BPM

## 2025-08-25 DIAGNOSIS — I10 ESSENTIAL HYPERTENSION: ICD-10-CM

## 2025-08-25 DIAGNOSIS — I73.9 PAD (PERIPHERAL ARTERY DISEASE): Primary | ICD-10-CM

## 2025-08-25 DIAGNOSIS — I63.9 CEREBROVASCULAR ACCIDENT (CVA), UNSPECIFIED MECHANISM: ICD-10-CM

## 2025-08-25 DIAGNOSIS — F17.200 SMOKER: ICD-10-CM

## 2025-08-25 DIAGNOSIS — I77.1 SUBCLAVIAN ARTERY STENOSIS, LEFT: ICD-10-CM

## 2025-08-25 DIAGNOSIS — I65.23 BILATERAL CAROTID ARTERY STENOSIS: ICD-10-CM

## 2025-08-25 DIAGNOSIS — E78.5 HYPERLIPIDEMIA LDL GOAL <70: ICD-10-CM
